# Patient Record
Sex: MALE | Race: WHITE | NOT HISPANIC OR LATINO | URBAN - NONMETROPOLITAN AREA
[De-identification: names, ages, dates, MRNs, and addresses within clinical notes are randomized per-mention and may not be internally consistent; named-entity substitution may affect disease eponyms.]

---

## 2020-03-20 ENCOUNTER — TELEMEDICINE (OUTPATIENT)
Dept: FAMILY MEDICINE CLINIC | Facility: CLINIC | Age: 50
End: 2020-03-20
Payer: COMMERCIAL

## 2020-03-20 DIAGNOSIS — K04.7 DENTAL INFECTION: Primary | ICD-10-CM

## 2020-03-20 PROCEDURE — 99441 PR PHYS/QHP TELEPHONE EVALUATION 5-10 MIN: CPT | Performed by: FAMILY MEDICINE

## 2020-03-20 RX ORDER — CLINDAMYCIN HYDROCHLORIDE 300 MG/1
300 CAPSULE ORAL EVERY 8 HOURS SCHEDULED
Qty: 21 CAPSULE | Refills: 0 | Status: SHIPPED | OUTPATIENT
Start: 2020-03-20 | End: 2020-03-27

## 2022-06-29 ENCOUNTER — HOSPITAL ENCOUNTER (EMERGENCY)
Facility: HOSPITAL | Age: 52
Discharge: HOME | End: 2022-06-29
Attending: EMERGENCY MEDICINE | Admitting: EMERGENCY MEDICINE
Payer: COMMERCIAL

## 2022-06-29 VITALS
HEIGHT: 72 IN | SYSTOLIC BLOOD PRESSURE: 175 MMHG | TEMPERATURE: 98.8 F | RESPIRATION RATE: 18 BRPM | BODY MASS INDEX: 31.23 KG/M2 | OXYGEN SATURATION: 99 % | WEIGHT: 230.6 LBS | DIASTOLIC BLOOD PRESSURE: 103 MMHG | HEART RATE: 105 BPM

## 2022-06-29 DIAGNOSIS — Z20.3 NEED FOR POST EXPOSURE PROPHYLAXIS FOR RABIES: ICD-10-CM
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
## 2022-06-29 DIAGNOSIS — S61.459A DOG BITE OF HAND: Primary | ICD-10-CM

## 2022-06-29 PROCEDURE — 96372 THER/PROPH/DIAG INJ SC/IM: CPT

## 2022-06-29 PROCEDURE — 90675 RABIES VACCINE IM: CPT | Performed by: EMERGENCY MEDICINE

## 2022-06-29 PROCEDURE — 90472 IMMUNIZATION ADMIN EACH ADD: CPT

## 2022-06-29 PROCEDURE — 99282 EMERGENCY DEPT VISIT SF MDM: CPT | Performed by: EMERGENCY MEDICINE

## 2022-06-29 PROCEDURE — 90715 TDAP VACCINE 7 YRS/> IM: CPT | Performed by: EMERGENCY MEDICINE

## 2022-06-29 PROCEDURE — 99283 EMERGENCY DEPT VISIT LOW MDM: CPT

## 2022-06-29 PROCEDURE — 90375 RABIES IG IM/SC: CPT | Performed by: EMERGENCY MEDICINE

## 2022-06-29 PROCEDURE — 90471 IMMUNIZATION ADMIN: CPT

## 2022-06-29 RX ORDER — SULFAMETHOXAZOLE AND TRIMETHOPRIM 800; 160 MG/1; MG/1
1 TABLET ORAL 2 TIMES DAILY
Qty: 10 TABLET | Refills: 0 | Status: SHIPPED | OUTPATIENT
Start: 2022-06-29 | End: 2022-07-04

## 2022-06-29 RX ADMIN — Medication 1 ML: at 14:47

## 2022-06-29 RX ADMIN — TETANUS TOXOID, REDUCED DIPHTHERIA TOXOID AND ACELLULAR PERTUSSIS VACCINE, ADSORBED 0.5 ML: 5; 2.5; 8; 8; 2.5 SUSPENSION INTRAMUSCULAR at 14:48

## 2022-06-29 RX ADMIN — RABIES IMMUNE GLOBULIN (HUMAN) 2040 UNITS: 300 INJECTION, SOLUTION INFILTRATION; INTRAMUSCULAR at 14:47

## 2022-06-29 NOTE — DISCHARGE INSTRUCTIONS
Return for any redness, swelling, drainage, worsening pain to your hand, or for any signs of an allergic reaction such as hives or rash, swelling of your lips/mouth/tongue, any difficulty breathing, or for any concerns  You received the Imovax rabies vaccine today  You will need to have three additional shots on July 2nd, July 6th, and July 13th  You can get your booster shots at an Urgent Care, but call the facility before to find out which vaccine they may have  You should only receive the Imovax vaccine due to your egg allergy  You should not receive the Rabivert vaccine as this does contain egg derived products

## 2022-06-29 NOTE — ED PROVIDER NOTES
History  Chief Complaint   Patient presents with    Dog Bite     Bit by a family members dog  on left hand  Per owner pet not up to date on rabies vaccine     47y RDH M w/ dog bite to the palm of the left hand  Reports he was standing in line at an ice cream shop and the group in front of him had a Haitian / Ariel [place] on a leash  The dog kept coming up to him and acted like it wanted to be pet and when the patient reached down to pet the dog, he was bit on the left hand  He and the dog immediately pulled away  Pt was able to get the phone number of the owner and found out the dog is 6m over due for a rabies vaccine and only had the "puppy" vaccine  Reportedly the dog is under quarantine and is only being taken out to void  Pt here b/c he doesn't want to take the chance that the dog owner will let him know if there is anything wrong w/ the dog  Has wound to the palmar hypothenar eminence of his left hand  Washed it well yesterday, applied some abx ointment and has it covered  No pain, no redness, swelling, or bruising noted  No other co      History provided by:  Patient and significant other   used: No    Dog Bite  Contact animal:  Dog  Location:  Hand  Hand injury location:  L hand (palm of left hand)  Time since incident:  1 day  Pain details:     Quality: none  Severity:  No pain  Incident location:  Outside  Provoked: provoked    Notification: unsure  Animal's rabies vaccination status:  Out of date  Animal in possession: yes    Relieved by:  Nothing  Worsened by:  Nothing  Ineffective treatments:  None tried  Associated symptoms: no fever and no swelling        None       Past Medical History:   Diagnosis Date    Anxiety     Depression     IBS (irritable bowel syndrome)        Past Surgical History:   Procedure Laterality Date    BICEPS TENDON REPAIR         No family history on file  I have reviewed and agree with the history as documented      E-Cigarette/Vaping E-Cigarette/Vaping Substances     Social History     Tobacco Use    Smoking status: Passive Smoke Exposure - Never Smoker    Smokeless tobacco: Never Used   Substance Use Topics    Alcohol use: Not Currently    Drug use: Not Currently       Review of Systems   Constitutional: Negative for fever  All other systems reviewed and are negative  Physical Exam  Physical Exam  Vitals and nursing note reviewed  Constitutional:       Appearance: Normal appearance  HENT:      Nose: Nose normal       Mouth/Throat:      Mouth: Mucous membranes are moist    Eyes:      Conjunctiva/sclera: Conjunctivae normal    Cardiovascular:      Rate and Rhythm: Normal rate  Pulmonary:      Effort: Pulmonary effort is normal    Abdominal:      Palpations: Abdomen is soft  Musculoskeletal:         General: No deformity  Left hand: Tenderness (mild) present  No swelling, deformity or bony tenderness  Normal range of motion  Normal strength  Normal sensation  There is no disruption of two-point discrimination  Normal capillary refill  Normal pulse  Arms:       Cervical back: Normal range of motion  Skin:     General: Skin is warm  Neurological:      General: No focal deficit present  Mental Status: He is alert     Psychiatric:         Mood and Affect: Mood normal          Vital Signs  ED Triage Vitals [06/29/22 1318]   Temperature Pulse Respirations Blood Pressure SpO2   98 8 °F (37 1 °C) 105 18 (!) 175/103 99 %      Temp Source Heart Rate Source Patient Position - Orthostatic VS BP Location FiO2 (%)   Temporal Monitor Lying Right arm --      Pain Score       No Pain           Vitals:    06/29/22 1318   BP: (!) 175/103   Pulse: 105   Patient Position - Orthostatic VS: Lying         Visual Acuity      ED Medications  Medications   rabies immune globulin, human (HyperRAB) injection 2,040 Units (2,040 Units Infiltration Given 6/29/22 1447)   rabies vaccine, human diploid (IMOVAX RABIES) IM injection 1 mL (1 mL Intramuscular Given 6/29/22 1447)   tetanus-diphtheria-acellular pertussis (BOOSTRIX) IM injection 0 5 mL (0 5 mL Intramuscular Given 6/29/22 1448)       Diagnostic Studies  Results Reviewed     None                 No orders to display              Procedures  Procedures         ED Course  ED Course as of 06/29/22 1518   Wed Jun 29, 2022   1420 Injected 1/2 of rabies immunoglobulin in and around the wound on the left hand (approx 3ml)    Pt tolerated well  Remainder of the immunoglobulin, the rabies vaccine and his tetanus booster were administered by the nurse  MDM  Number of Diagnoses or Management Options  Dog bite of hand: new and does not require workup  Need for post exposure prophylaxis for rabies: new and does not require workup     Amount and/or Complexity of Data Reviewed  Obtain history from someone other than the patient: yes        Disposition  Final diagnoses:   Dog bite of hand   Need for post exposure prophylaxis for rabies     Time reflects when diagnosis was documented in both MDM as applicable and the Disposition within this note     Time User Action Codes Description Comment    6/29/2022  2:00 PM Brandon Quick Add [[id number] [id number],  W54  0XXA] Dog bite of hand     6/29/2022  2:01 PM Gabi Garces Add [Z20 3] Need for post exposure prophylaxis for rabies       ED Disposition     ED Disposition   Discharge    Condition   Stable    Date/Time   Wed Jun 29, 2022  2:03 PM    Preston Da Silva discharge to home/self care                 Follow-up Information    None         Patient's Medications   Discharge Prescriptions    SULFAMETHOXAZOLE-TRIMETHOPRIM (BACTRIM DS) 800-160 MG PER TABLET    Take 1 tablet by mouth 2 (two) times a day for 5 days smx-tmp DS (BACTRIM) 800-160 mg tabs (1tab q12 D10)       Start Date: 6/29/2022 End Date: 7/4/2022       Order Dose: 1 tablet       Quantity: 10 tablet    Refills: 0       No discharge procedures on file     PDMP Review     None          ED Provider  Electronically Signed by           Gilford Comings, DO  06/29/22 6615

## 2022-07-02 ENCOUNTER — OFFICE VISIT (OUTPATIENT)
Dept: URGENT CARE | Facility: MEDICAL CENTER | Age: 52
End: 2022-07-02
Payer: COMMERCIAL

## 2022-07-02 VITALS — TEMPERATURE: 98.4 F
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
## 2022-07-02 PROCEDURE — 90471 IMMUNIZATION ADMIN: CPT

## 2022-07-02 PROCEDURE — 90675 RABIES VACCINE IM: CPT

## 2022-07-06 ENCOUNTER — CLINICAL SUPPORT (OUTPATIENT)
Dept: URGENT CARE | Facility: MEDICAL CENTER | Age: 52
End: 2022-07-06
Payer: COMMERCIAL

## 2022-07-06 DIAGNOSIS — Z20.3 CONTACT WITH AND (SUSPECTED) EXPOSURE TO RABIES: Primary | ICD-10-CM

## 2022-07-06 PROCEDURE — 90471 IMMUNIZATION ADMIN: CPT

## 2022-07-06 PROCEDURE — 90675 RABIES VACCINE IM: CPT

## 2022-07-06 NOTE — PROGRESS NOTES
3rd rabies immunization given to patient in left deltoid, pt   Tolerated well, no reaction noted from previous injection to right deltoid, immunization recorded in chart

## 2022-07-13 ENCOUNTER — OFFICE VISIT (OUTPATIENT)
Dept: URGENT CARE | Facility: MEDICAL CENTER | Age: 52
End: 2022-07-13
Payer: COMMERCIAL

## 2022-07-13 VITALS
DIASTOLIC BLOOD PRESSURE: 100 MMHG | SYSTOLIC BLOOD PRESSURE: 142 MMHG | OXYGEN SATURATION: 99 % | HEART RATE: 101 BPM | WEIGHT: 233 LBS | RESPIRATION RATE: 18 BRPM | TEMPERATURE: 98 F | BODY MASS INDEX: 31.6 KG/M2

## 2022-07-13 DIAGNOSIS — T14.8XXA ANIMAL BITE: Primary | ICD-10-CM

## 2022-07-13 PROCEDURE — 90471 IMMUNIZATION ADMIN: CPT | Performed by: PHYSICIAN ASSISTANT

## 2022-07-13 PROCEDURE — 90675 RABIES VACCINE IM: CPT

## 2022-07-13 NOTE — PROGRESS NOTES
Day 14: post animal bite for # 4 rabies vaccine administration  No complications noted from previous injections